# Patient Record
Sex: MALE | Race: WHITE | Employment: UNEMPLOYED | URBAN - METROPOLITAN AREA
[De-identification: names, ages, dates, MRNs, and addresses within clinical notes are randomized per-mention and may not be internally consistent; named-entity substitution may affect disease eponyms.]

---
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
## 2019-06-12 ENCOUNTER — HOSPITAL ENCOUNTER (EMERGENCY)
Facility: HOSPITAL | Age: 19
Discharge: HOME | End: 2019-06-12
Attending: NURSE PRACTITIONER | Admitting: NURSE PRACTITIONER
Payer: COMMERCIAL

## 2019-06-12 VITALS
OXYGEN SATURATION: 100 % | DIASTOLIC BLOOD PRESSURE: 74 MMHG | TEMPERATURE: 97.1 F | RESPIRATION RATE: 16 BRPM | SYSTOLIC BLOOD PRESSURE: 140 MMHG

## 2019-06-12 DIAGNOSIS — S61.211A LACERATION OF LEFT INDEX FINGER WITHOUT FOREIGN BODY WITHOUT DAMAGE TO NAIL, INITIAL ENCOUNTER: ICD-10-CM

## 2019-06-12 DIAGNOSIS — S61.219A LACERATION OF FINGER: ICD-10-CM

## 2019-06-12 PROCEDURE — 40000268 ZZH STATISTIC NO CHARGES

## 2019-06-12 PROCEDURE — 12001 RPR S/N/AX/GEN/TRNK 2.5CM/<: CPT

## 2019-06-12 PROCEDURE — 12001 RPR S/N/AX/GEN/TRNK 2.5CM/<: CPT | Performed by: NURSE PRACTITIONER

## 2019-06-12 ASSESSMENT — ENCOUNTER SYMPTOMS
ACTIVITY CHANGE: 1
WOUND: 1

## 2019-06-12 NOTE — ED PROVIDER NOTES
History     Chief Complaint   Patient presents with     Laceration     left pointer finger laceration. happened around midnight last night.      HPI  Darren Mariscal is a 19 year old male who is accompanied per mom. He obtained a small laceration on his left index finger while working with a . He denies fevers, chills, nausea, and vomiting, numbness and tingling.    Allergies:  No Known Allergies    Problem List:    There are no active problems to display for this patient.       Past Medical History:    No past medical history on file.    Past Surgical History:    Past Surgical History:   Procedure Laterality Date     ORTHOPEDIC SURGERY Right 05/22/2017       Family History:    Family History   Problem Relation Age of Onset     Diabetes Maternal Uncle        Social History:  Marital Status:  Single [1]  Social History     Tobacco Use     Smoking status: Never Smoker     Tobacco comment: no passive exposure   Substance Use Topics     Alcohol use: No     Drug use: No        Medications:      acetaminophen-codeine (TYLENOL #3) 300-30 MG per tablet   ibuprofen (ADVIL/MOTRIN) 800 MG tablet   IBUPROFEN PO   order for DME         Review of Systems   Constitutional: Positive for activity change.   Skin: Positive for wound.   All other systems reviewed and are negative.      Physical Exam   BP: 140/74  Heart Rate: 58  Temp: 97.1  F (36.2  C)  Resp: 16  SpO2: 100 %      Physical Exam   Constitutional: He appears well-developed and well-nourished. No distress.   Musculoskeletal:        Hands:  Opposition with thumb intact all four fingers. Adduction, abduction, flexion, extension and CMS intact; left index finger. Radial pulse 3+ left wrist.   Skin: Skin is warm and dry.   Nursing note and vitals reviewed.      ED Course        Laceration repair  Date/Time: 6/12/2019 11:53 AM  Performed by: Nithya Tinajero CNP  Authorized by: Nithya Tinajero CNP   Consent: Verbal consent obtained.  Risks and benefits:  risks, benefits and alternatives were discussed  Consent given by: patient  Patient understanding: patient states understanding of the procedure being performed  Patient identity confirmed: verbally with patient  Body area: upper extremity  Location details: left index finger  Laceration length: 0.7 cm  Foreign bodies: no foreign bodies  Tendon involvement: none  Nerve involvement: none  Vascular damage: no  Anesthesia: local infiltration    Anesthesia:  Local Anesthetic: lidocaine 1% without epinephrine  Anesthetic total: 0.5 mL    Sedation:  Patient sedated: no    Irrigation solution: tap water  Irrigation method: syringe  Amount of cleaning: standard  Debridement: minimal  Degree of undermining: none  Skin closure: 4-0 nylon  Number of sutures: 2  Technique: simple  Approximation: close  Approximation difficulty: simple  Dressing: antibiotic ointment and 4x4 sterile gauze  Patient tolerance: Patient tolerated the procedure well with no immediate complications                       No results found for this or any previous visit (from the past 24 hour(s)).    Medications - No data to display    Assessments & Plan (with Medical Decision Making)     I have reviewed the nursing notes.    I have reviewed the findings, diagnosis, plan and need for follow up with the patient.  Laceration of left index finger over MIP joint, occurred working with a , repaired with two sutures 4.0 Ethilon after anesthestized wound with 0.5 ml of 1% lidocaine. Neurovascular status intact. Discharge instructions and medications reviewed with patient and mom and understanding verbalized.       Medication List      There are no discharge medications for this visit.         Final diagnoses:   Laceration of finger - left index finger MIP joint       6/12/2019   [facility]     Nithya Tinajero, CNP  06/12/19 3230

## 2019-06-12 NOTE — DISCHARGE INSTRUCTIONS
Apply bacitracin and do daily dressing changes for the next 48 hours. You may shower but do not saturate the wound. If you have increased pain, redness at wound site, fevers, or abnormal drainage (purulent/pus) you need to see your primary care provider or return to Urgent Care/ER immediately. Ibuprofen or Acetaminophen  for pain.  Suture removal in  ten days.

## 2019-06-12 NOTE — ED TRIAGE NOTES
Pt presents with a laceration to his left index finger that he cut with a  today. Laceration is 3/8 inch long.

## 2019-06-12 NOTE — ED AVS SNAPSHOT
[facility]  [address] [place] [address]  Phone:  [phone]                                    Darren Mariscal   MRN: [id number]    Department:  [facility]   Date of Visit:  6/12/2019           After Visit Summary Signature Page    I have received my discharge instructions, and my questions have been answered. I have discussed any challenges I see with this plan with the nurse or doctor.    ..........................................................................................................................................  Patient/Patient Representative Signature      ..........................................................................................................................................  Patient Representative Print Name and Relationship to Patient    ..................................................               ................................................  Date                                   Time    ..........................................................................................................................................  Reviewed by Signature/Title    ...................................................              ..............................................  Date                                               Time          22EPIC Rev 08/18

## 2021-05-08 ENCOUNTER — TRANSFERRED RECORDS (OUTPATIENT)
Dept: HEALTH INFORMATION MANAGEMENT | Facility: CLINIC | Age: 21
End: 2021-05-08